# Patient Record
Sex: FEMALE | Race: WHITE | NOT HISPANIC OR LATINO | Employment: OTHER | URBAN - METROPOLITAN AREA
[De-identification: names, ages, dates, MRNs, and addresses within clinical notes are randomized per-mention and may not be internally consistent; named-entity substitution may affect disease eponyms.]

---

## 2020-07-11 ENCOUNTER — APPOINTMENT (OUTPATIENT)
Dept: CT IMAGING | Facility: CLINIC | Age: 45
End: 2020-07-11
Attending: EMERGENCY MEDICINE
Payer: MEDICARE

## 2020-07-11 ENCOUNTER — HOSPITAL ENCOUNTER (EMERGENCY)
Facility: CLINIC | Age: 45
Discharge: HOME | End: 2020-07-11
Attending: EMERGENCY MEDICINE | Admitting: EMERGENCY MEDICINE
Payer: MEDICARE

## 2020-07-11 ENCOUNTER — APPOINTMENT (OUTPATIENT)
Dept: GENERAL RADIOLOGY | Facility: CLINIC | Age: 45
End: 2020-07-11
Attending: EMERGENCY MEDICINE
Payer: MEDICARE

## 2020-07-11 VITALS
OXYGEN SATURATION: 99 % | WEIGHT: 200 LBS | RESPIRATION RATE: 16 BRPM | DIASTOLIC BLOOD PRESSURE: 100 MMHG | SYSTOLIC BLOOD PRESSURE: 179 MMHG | BODY MASS INDEX: 35.43 KG/M2 | TEMPERATURE: 98.6 F | HEART RATE: 71 BPM

## 2020-07-11 DIAGNOSIS — I16.0 HYPERTENSIVE URGENCY: ICD-10-CM

## 2020-07-11 DIAGNOSIS — N28.9 RENAL INSUFFICIENCY: ICD-10-CM

## 2020-07-11 DIAGNOSIS — M54.50 ACUTE BILATERAL LOW BACK PAIN WITHOUT SCIATICA: ICD-10-CM

## 2020-07-11 LAB
ALBUMIN SERPL-MCNC: 3.8 G/DL (ref 3.4–5)
ALBUMIN UR-MCNC: 10 MG/DL
ALP SERPL-CCNC: 81 U/L (ref 40–150)
ALT SERPL W P-5'-P-CCNC: 31 U/L (ref 0–50)
ANION GAP SERPL CALCULATED.3IONS-SCNC: 5 MMOL/L (ref 3–14)
APPEARANCE UR: ABNORMAL
AST SERPL W P-5'-P-CCNC: 22 U/L (ref 0–45)
BASOPHILS # BLD AUTO: 0 10E9/L (ref 0–0.2)
BASOPHILS NFR BLD AUTO: 0.4 %
BILIRUB SERPL-MCNC: 0.3 MG/DL (ref 0.2–1.3)
BILIRUB UR QL STRIP: NEGATIVE
BUN SERPL-MCNC: 13 MG/DL (ref 7–30)
CALCIUM SERPL-MCNC: 9.2 MG/DL (ref 8.5–10.1)
CHLORIDE SERPL-SCNC: 108 MMOL/L (ref 94–109)
CO2 SERPL-SCNC: 29 MMOL/L (ref 20–32)
COLOR UR AUTO: YELLOW
CREAT SERPL-MCNC: 1.18 MG/DL (ref 0.52–1.04)
DIFFERENTIAL METHOD BLD: NORMAL
EOSINOPHIL # BLD AUTO: 0.2 10E9/L (ref 0–0.7)
EOSINOPHIL NFR BLD AUTO: 1.9 %
ERYTHROCYTE [DISTWIDTH] IN BLOOD BY AUTOMATED COUNT: 13.2 % (ref 10–15)
GFR SERPL CREATININE-BSD FRML MDRD: 55 ML/MIN/{1.73_M2}
GLUCOSE SERPL-MCNC: 67 MG/DL (ref 70–99)
GLUCOSE UR STRIP-MCNC: NEGATIVE MG/DL
HCT VFR BLD AUTO: 41.2 % (ref 35–47)
HGB BLD-MCNC: 13.4 G/DL (ref 11.7–15.7)
HGB UR QL STRIP: NEGATIVE
IMM GRANULOCYTES # BLD: 0 10E9/L (ref 0–0.4)
IMM GRANULOCYTES NFR BLD: 0.2 %
KETONES UR STRIP-MCNC: NEGATIVE MG/DL
LACTATE BLD-SCNC: 1 MMOL/L (ref 0.7–2)
LEUKOCYTE ESTERASE UR QL STRIP: ABNORMAL
LIPASE SERPL-CCNC: 67 U/L (ref 73–393)
LYMPHOCYTES # BLD AUTO: 3 10E9/L (ref 0.8–5.3)
LYMPHOCYTES NFR BLD AUTO: 32.5 %
MCH RBC QN AUTO: 29.6 PG (ref 26.5–33)
MCHC RBC AUTO-ENTMCNC: 32.5 G/DL (ref 31.5–36.5)
MCV RBC AUTO: 91 FL (ref 78–100)
MONOCYTES # BLD AUTO: 0.8 10E9/L (ref 0–1.3)
MONOCYTES NFR BLD AUTO: 8.2 %
NEUTROPHILS # BLD AUTO: 5.2 10E9/L (ref 1.6–8.3)
NEUTROPHILS NFR BLD AUTO: 56.8 %
NITRATE UR QL: NEGATIVE
NRBC # BLD AUTO: 0 10*3/UL
NRBC BLD AUTO-RTO: 0 /100
PH UR STRIP: 5 PH (ref 5–7)
PLATELET # BLD AUTO: 250 10E9/L (ref 150–450)
POTASSIUM SERPL-SCNC: 4.1 MMOL/L (ref 3.4–5.3)
PROT SERPL-MCNC: 7.5 G/DL (ref 6.8–8.8)
RBC # BLD AUTO: 4.53 10E12/L (ref 3.8–5.2)
RBC #/AREA URNS AUTO: 0 /HPF (ref 0–2)
SODIUM SERPL-SCNC: 142 MMOL/L (ref 133–144)
SOURCE: ABNORMAL
SP GR UR STRIP: 1.02 (ref 1–1.03)
SQUAMOUS #/AREA URNS AUTO: 7 /HPF (ref 0–1)
UROBILINOGEN UR STRIP-MCNC: NORMAL MG/DL (ref 0–2)
WBC # BLD AUTO: 9.1 10E9/L (ref 4–11)
WBC #/AREA URNS AUTO: 4 /HPF (ref 0–5)

## 2020-07-11 PROCEDURE — 80053 COMPREHEN METABOLIC PANEL: CPT | Performed by: EMERGENCY MEDICINE

## 2020-07-11 PROCEDURE — 25000128 H RX IP 250 OP 636: Performed by: EMERGENCY MEDICINE

## 2020-07-11 PROCEDURE — 83605 ASSAY OF LACTIC ACID: CPT | Performed by: EMERGENCY MEDICINE

## 2020-07-11 PROCEDURE — 83690 ASSAY OF LIPASE: CPT | Performed by: EMERGENCY MEDICINE

## 2020-07-11 PROCEDURE — 96374 THER/PROPH/DIAG INJ IV PUSH: CPT | Mod: 59

## 2020-07-11 PROCEDURE — 85025 COMPLETE CBC W/AUTO DIFF WBC: CPT | Performed by: EMERGENCY MEDICINE

## 2020-07-11 PROCEDURE — 81001 URINALYSIS AUTO W/SCOPE: CPT | Performed by: EMERGENCY MEDICINE

## 2020-07-11 PROCEDURE — 99285 EMERGENCY DEPT VISIT HI MDM: CPT | Mod: 25

## 2020-07-11 PROCEDURE — 74177 CT ABD & PELVIS W/CONTRAST: CPT

## 2020-07-11 PROCEDURE — 25800030 ZZH RX IP 258 OP 636: Performed by: EMERGENCY MEDICINE

## 2020-07-11 PROCEDURE — 93005 ELECTROCARDIOGRAM TRACING: CPT

## 2020-07-11 PROCEDURE — 96361 HYDRATE IV INFUSION ADD-ON: CPT

## 2020-07-11 PROCEDURE — 96375 TX/PRO/DX INJ NEW DRUG ADDON: CPT | Mod: 59

## 2020-07-11 PROCEDURE — 25000132 ZZH RX MED GY IP 250 OP 250 PS 637: Mod: GY | Performed by: EMERGENCY MEDICINE

## 2020-07-11 PROCEDURE — 25000125 ZZHC RX 250: Performed by: EMERGENCY MEDICINE

## 2020-07-11 PROCEDURE — 71045 X-RAY EXAM CHEST 1 VIEW: CPT

## 2020-07-11 RX ORDER — OLANZAPINE 10 MG/1
10 TABLET ORAL ONCE
Status: COMPLETED | OUTPATIENT
Start: 2020-07-11 | End: 2020-07-11

## 2020-07-11 RX ORDER — HYDRALAZINE HYDROCHLORIDE 20 MG/ML
5 INJECTION INTRAMUSCULAR; INTRAVENOUS ONCE
Status: COMPLETED | OUTPATIENT
Start: 2020-07-11 | End: 2020-07-11

## 2020-07-11 RX ORDER — IOPAMIDOL 755 MG/ML
500 INJECTION, SOLUTION INTRAVASCULAR ONCE
Status: COMPLETED | OUTPATIENT
Start: 2020-07-11 | End: 2020-07-11

## 2020-07-11 RX ORDER — HYDROMORPHONE HYDROCHLORIDE 1 MG/ML
0.5 INJECTION, SOLUTION INTRAMUSCULAR; INTRAVENOUS; SUBCUTANEOUS ONCE
Status: COMPLETED | OUTPATIENT
Start: 2020-07-11 | End: 2020-07-11

## 2020-07-11 RX ORDER — SODIUM CHLORIDE 9 MG/ML
INJECTION, SOLUTION INTRAVENOUS CONTINUOUS
Status: DISCONTINUED | OUTPATIENT
Start: 2020-07-11 | End: 2020-07-12 | Stop reason: HOSPADM

## 2020-07-11 RX ORDER — CYCLOBENZAPRINE HCL 10 MG
10 TABLET ORAL 3 TIMES DAILY PRN
Qty: 20 TABLET | Refills: 0 | Status: SHIPPED | OUTPATIENT
Start: 2020-07-11 | End: 2020-07-17

## 2020-07-11 RX ORDER — HYDRALAZINE HYDROCHLORIDE 20 MG/ML
5 INJECTION INTRAMUSCULAR; INTRAVENOUS ONCE
Status: DISCONTINUED | OUTPATIENT
Start: 2020-07-11 | End: 2020-07-12 | Stop reason: HOSPADM

## 2020-07-11 RX ORDER — ATENOLOL 50 MG/1
100 TABLET ORAL DAILY
Qty: 15 TABLET | Refills: 0 | Status: SHIPPED | OUTPATIENT
Start: 2020-07-11 | End: 2020-07-26

## 2020-07-11 RX ORDER — PREDNISONE 20 MG/1
50 TABLET ORAL DAILY
Qty: 13 TABLET | Refills: 0 | Status: SHIPPED | OUTPATIENT
Start: 2020-07-11 | End: 2020-07-16

## 2020-07-11 RX ADMIN — HYDRALAZINE HYDROCHLORIDE 5 MG: 20 INJECTION INTRAMUSCULAR; INTRAVENOUS at 19:10

## 2020-07-11 RX ADMIN — SODIUM CHLORIDE 60 ML: 9 INJECTION, SOLUTION INTRAVENOUS at 19:56

## 2020-07-11 RX ADMIN — OLANZAPINE 10 MG: 10 TABLET, FILM COATED ORAL at 19:33

## 2020-07-11 RX ADMIN — SODIUM CHLORIDE 1000 ML: 9 INJECTION, SOLUTION INTRAVENOUS at 19:25

## 2020-07-11 RX ADMIN — HYDROMORPHONE HYDROCHLORIDE 0.5 MG: 1 INJECTION, SOLUTION INTRAMUSCULAR; INTRAVENOUS; SUBCUTANEOUS at 19:40

## 2020-07-11 RX ADMIN — IOPAMIDOL 80 ML: 755 INJECTION, SOLUTION INTRAVENOUS at 19:55

## 2020-07-11 ASSESSMENT — ENCOUNTER SYMPTOMS
DIFFICULTY URINATING: 0
FEVER: 0
FREQUENCY: 1
COUGH: 0
DIARRHEA: 0
HEADACHES: 1
VOMITING: 0
SHORTNESS OF BREATH: 0
FLANK PAIN: 1
NAUSEA: 0
CHILLS: 0
SORE THROAT: 0

## 2020-07-11 NOTE — ED TRIAGE NOTES
Pt complaints of bilateral flank pain left worse than right for x4 days. States she is also having urinary frequency. ABC intact. A&Ox4.

## 2020-07-11 NOTE — ED PROVIDER NOTES
"  History     Chief Complaint:  ***  No chief complaint on file.      HPI   Karen Chance is a 45 year old female who presents with ***    Allergies:  Cefzil  Celebrex  Codeine  Morphine  Naproxen  Sulfa drugs  Toradol  Tramadol    Medications:    Klonopin  Atenolol  Prozac    Past Medical History:    Anxiety  Depression  Hypertension    Past Surgical History:    Abdomen surgery  Gyn surgery  Hernia repair  Orthopedic surgery    Family History:    History reviewed. No pertinent family history.     Social History:  Smoking status: Former smoker  Alcohol use: No  Drug use: No  PCP: Rita Gayle MD  Presents to the ED with ***  Marital Status:  Single [1]    Review of Systems  ***    Physical Exam   No data found.    Physical Exam  ***    Emergency Department Course   ECG:  ***    Imaging:  {Radiographic findings?:728496540::\" \"}  ***    Laboratory:  ***    Procedures:  ***        Interventions:  ***  {Response to meds:890024::\" \"}    Emergency Department Course:  ***       Impression & Plan      {[facility]:121387}    {trauma activation?:374400::\" \"}  CMS Diagnoses: {Sepsis/Septic Shock/Stemi/Stroke:054302::\" \"}       Medical Decision Making:  ***  Critical Care time:  {none or minutes:710759::\"none\"}    Diagnosis:  No diagnosis found.    Disposition:  {discharged to home/discharged to home with.../Admitted to...:403654}    Discharge Medications:  New Prescriptions    No medications on file     I, Stephen Lott, am serving as a scribe at 5:49 PM on 7/11/2020 to document services personally performed by *** based on my observations and the provider's statements to me.     Stephen Lott  7/11/2020   [facility] EMERGENCY DEPARTMENT    "

## 2020-07-11 NOTE — ED PROVIDER NOTES
"  History     Chief Complaint:  Flank Pain    HPI   Karen Chance is a 45 year old female with a history of hypertension who presents with flank pain. The patient reports 4 days ago she developed bilateral flank pain that wraps around to her lower abdomen. At the same time, she developed urgency but denies burning with urination. She describes her pain as \"having a baby out her backside\" and notes the pain increases with breathing. She notes her current symptoms are distinct from past UTIs and chronic back pain. She has taken ibuprofen and Tylenol with no alleviation. She endorses headache. Of note, she is not taking her Atenolol and reports she has not treated her hypertension in a long time.     Allergies:  Cefzil  Celebrex  Codeine sulfate  Morphine HCl  Naproxen  Oxycodone  Sulfa drugs  Toradol  Tramadol     Medications:    Atenolol  Clonazepam  Fluoxetine HCl  ondansetron    Past Medical History:    Anxiety  Depressive disorder  Hypertension    Past Surgical History:    Abdomen surgery  GYN surgery  Hernia repair  Orthopedic surgery    Family History:    History reviewed. No pertinent family history.     Social History:  Smoking status- former smoker  Alcohol use- no  Drug use- no  Marital Status:  Single [1]       Review of Systems   Constitutional: Negative for chills and fever.   HENT: Negative for sore throat.    Respiratory: Negative for cough and shortness of breath.    Cardiovascular: Negative for chest pain.   Gastrointestinal: Negative for diarrhea, nausea and vomiting.   Genitourinary: Positive for flank pain, frequency and urgency. Negative for difficulty urinating.   Neurological: Positive for headaches.   All other systems reviewed and are negative.      Physical Exam     Patient Vitals for the past 24 hrs:   BP Temp Heart Rate Resp SpO2 Weight   07/11/20 1749 (!) 192/134 98.6  F (37  C) 107 18 100 % 90.7 kg (200 lb)       Physical Exam    GEN: alert    HEAD: atraumatic    EYES: pupils " reactive, extraocular muscles intact, conjunctivae normal    ENT: TMs normal as are EACs; nares patent; normal posterior pharynx and oral mucosa    NECK: no posterior midline tenderness, no meningeal signs, trachea midline     RESPIRATORY: no tachypnea, breath sounds clear to auscultation    CVS: normal S1/S2, no murmurs/rubs/gallops    ABDOMEN: soft, tender in paralumbar region bilaterally, uncomfortable with transfer, no localized abdominal tenderness, no masses or organomegaly, no rebound, positive bowel sounds    MUSCULOSKELETAL: no deformities    SKIN: warm and dry, no acute rashes or ulceration, no erythema     NEURO: GCS 15, cranial nerves intact.  Motor and sensory- good tone; normal gait and coordination    LYMPH: no lymphadenopathy    PSYCHE:  Talkative.  No SI/HI, no hallucinations    Emergency Department Course     ECG (18:55:48):  Rate 80 bpm. VA interval 128. QRS duration 80. QT/QTc 402/463. P-R-T axes 74 68 38. Normal sinus rhythm. Normal ECG. Interpreted at 1900 by Jose Alberto Aguero MD.    Imaging:  Radiology findings were communicated with the patient who voiced understanding of the findings.    XR Chest Port 1 View:  Negative chest.    As read by Radiology.    CT Aortic Survey w contrast   1.  No thoracic or abdominal aortic dissection or other acute abnormality.     Reading per radiology    Laboratory:  Laboratory findings were communicated with the patient who voiced understanding of the findings.    CBC: WNL (WBC 9.1, HGB 13.4, )  CMP: Glucose 67 (L), GFR estimate 55 (L) o/w WNL (Creatinine 1.18 (H))    Lipase: 67  Lactic Acid: 1.0    UA with Microscopic: Protein Albumin 10, leukocyte esterase - small, squamous epithelial 7 (H) o/w negative.     Interventions:  1910 Apresoline    1925 NS 1L IV Bolus    1933 Zyprexa 10 mg IV    1940 Dilaudid 0.5 mg IV    2124 Apresoline 5 mg IV    Emergency Department Course:  Past medical records, nursing notes, and vitals reviewed.    1806 I performed an  exam of the patient as documented above.      IV was inserted and blood was drawn for laboratory testing, results above.     The patient provided a urine sample here in the emergency department. This was sent for laboratory testing, findings above.     The patient was sent for a XR chest and CT aortic while in the emergency department, results above.     2150 I rechecked the patient and discussed the results of her workup thus far.     Findings and plan explained to the Patient. Patient discharged home with instructions regarding supportive care, medications, and reasons to return. The importance of close follow-up was reviewed.     Impression & Plan     Medical Decision Making:  Karen Chance is a 45 year old female who presents with bilateral flank pain. She is noted to be very hypertensive here. She was given pain medicine and remained hypertensive, and given hydralazine. She expressed to the nurse that she'd also been having chest pain. In light of her back pain with chest pain and abdominal pain I was concerned about a possible aortic dissection. I did a CT aortic survery which was fortunately negative.     She did improve with treatment here which consisted of dilaudid and olanzapine. She had some mildly pressured speech when she came in and was somewhat hyperkinetic in terms of speech. She is continuously talking about various things mainly about her medical complaints. EKG was done when she complained of chest pain and it showed no ischemic changes. I did not order troponin as the EKG was normal and she is not diabetic so I do not think she is having silent ischemia. The chest pain seems to be radiating from her flank as it wraps around her stomach also causing pain spreads up into the lower chest region at times. This is intermittent in nature this seems to be possibly worse with movement initially. This actually resolved with treatment of olanzapine and dilaudid. Olanzapine was given because she  was quite anxious and a little agitated about her condition. This seemed to help quite considerably.     Labs show clear urine, lipase is normal, CBC with differential entirely normal, and comprehensive metabolic panrl shows glucose actually normal but slightly low at 67, creatinine elevated at 1.18, and GFR estimate of 55. This is significant change from 2013 when I can see her last creatinine GFR. They were completely normal then. She has not been taking her atenolol in a year. I discussed with her if she continues on with this course and she doesn't take her antihypertensives she will lose her kidneys and wind up on dialysis. I tried to describe that for her. She stated she would start her atenolol. She was on 100 mg daily, I have recommended she start first with 15 mg once a day and then talk with her doctor over the course of a week and have her blood pressure rechecked and reevaluated of her symptoms at that time. I asked her if she would like me to write a new prescription for her so she doesn't forget and take 100 to start she stated she would remember what to do she prefers me to read the script as written but she will start with half that dose as dicussed. Earlier patient is ambulatory her pain has improved and she is being discharged home with my instructions and medications and follow up.     Diagnosis:    ICD-10-CM   1. Acute bilateral low back pain without sciatica  M54.5   2. Hypertensive urgency  I16.0   3. Renal insufficiency  N28.9       Disposition:  Discharged to home.    Discharge Medications:  New Prescriptions    ATENOLOL (TENORMIN) 50 MG TABLET    Take 2 tablets (100 mg) by mouth daily for 15 days    CYCLOBENZAPRINE (FLEXERIL) 10 MG TABLET    Take 1 tablet (10 mg) by mouth 3 times daily as needed for muscle spasms    PREDNISONE (DELTASONE) 20 MG TABLET    Take 2.5 tablets (50 mg) by mouth daily for 5 days       Scribe Disclosure:  I, Jane Ennis, am serving as a scribe at 6:04 PM on 7/11/2020  to document services personally performed by Jose Alberto Aguero MD based on my observations and the provider's statements to me.        Jose Alberto Aguero MD  07/12/20 1916

## 2020-07-11 NOTE — ED AVS SNAPSHOT
[facility] Emergency Department  [address]  [facility] [address]  Phone:  [phone]  Fax:  [phone]                                    Karen Chance   MRN: [id number]    Department:  [facility] Emergency Department   Date of Visit:  7/11/2020           After Visit Summary Signature Page    I have received my discharge instructions, and my questions have been answered. I have discussed any challenges I see with this plan with the nurse or doctor.    ..........................................................................................................................................  Patient/Patient Representative Signature      ..........................................................................................................................................  Patient Representative Print Name and Relationship to Patient    ..................................................               ................................................  Date                                   Time    ..........................................................................................................................................  Reviewed by Signature/Title    ...................................................              ..............................................  Date                                               Time          22EPIC Rev 08/18

## 2020-07-12 NOTE — ED NOTES
Patient feels much better. She is in no pain now when she is lying still. She has mild pain when up and walking, which she rates a 3/10. This pain is only located in her left flank, and all other pain has resolved. Discharge instructions reviewed with patient. She is in stable condition and understands the discharge instructions. She is eating skittles in the room and has been able to void and ambulate without difficulty. She understands that she is not able to drive herself home, as she received an opioid while in the ED. She will be calling a family member for a ride home. I offered to help her get her prescriptions filled with our pharmacy in the hospital, however she declined.

## 2020-07-12 NOTE — ED NOTES
Patient complaining of severe pain in her head, chest, abdomen, and back. MD notified. CT scan will be ordered. MD aware that there was minimal response to hydralazine. Patient is stable at this time.

## 2020-07-12 NOTE — ED NOTES
Per MD, patient instructed to start with only one tab of atenolol (50mg) daily. She should follow up with primary care before increasing the dose

## 2020-07-12 NOTE — DISCHARGE INSTRUCTIONS
YOUR KIDNEY FUNCTION IS BEING AFFECTED, PERMANENTLY BY UNCONTROLLED HIGH BLOOD PRESSURE.  IT IS VERY IMPORTANT FOR YOU TO TAKE YOUR BLOOD PRESSURE MEDICINE OR YOU WILL EVENTUALLY LOSE YOUR KIDNEYS AND HAVE TO GO ON DIALYSIS.  RESTART YOUR ATENOLOL DAILY, EVERYDAY, AND FOLLOW UP WITH YOUR DOCTOR REGULARLY TO CHECK YOUR BLOOD PRESSURE.  Discharge Instructions  Back Pain  You were seen today for back pain. Back pain can have many causes, but most will get better without surgery or other specific treatment. Sometimes there is a herniated ( slipped ) disc. We don t usually do MRI scans to look for these right away, since most herniated discs will get better on their own with time.  Today, we did not find any evidence that your back pain was caused by a serious condition, such as an infection, fracture, or tumor. However, sometimes symptoms develop over time and cannot be found during an emergency visit, so it is very important that you follow up with your primary doctor.  Return to the Emergency Department if:  You develop a fever with your back pain.   You have weakness or change in sensation in one or both legs.  You lose control of your bowels or bladder, or can t empty your bladder.  Your pain gets much worse.     Follow-up with your doctor:  Unless your pain has completely gone away, please make an appointment with your doctor within one week.  You may need further management of your back pain, such as more pain medication, imaging such as an X-ray or MRI, or physical therapy.    What can I do to help myself?  Remain Active -- People are often afraid that they will hurt their back further or delay recovery by remaining active, but this is one of the best things you can do for your back. In fact, prolonged bed rest is not recommended. Studies have shown that people with low back pain recover faster when they remain active. Movement helps to bring blood flow to the muscles and relieve muscle spasms as well as  preventing loss of muscle strength.  Heat -- Using a heating pad can help with low back pain during the first few weeks. Do not sleep with a heating pad, as you can be burned.   Pain medications - You may take a pain medication such as Tylenol  (acetaminophen), Advil , Nuprin  (ibuprofen) or Aleve  (naproxen).  If you have been given a narcotic such as Vicodin  (hydrocodone with acetaminophen), Percocet  (oxycodone with acetaminophen), codeine, or a muscle relaxant such as Flexeril  (cyclobenzaprine) or Soma  (carisoprodol), do not drive for four hours after you have taken it. If the narcotic contains Tylenol  (acetaminophen), do not take Tylenol  with it. All narcotics will cause constipation, so eat a high fiber diet.   If you were given a prescription for medicine here today, be sure to read all of the information (including the package insert) that comes with your prescription.  This will include important information about the medicine, its side effects, and any warnings that you need to know about.  The pharmacist who fills the prescription can provide more information and answer questions you may have about the medicine.  If you have questions or concerns that the pharmacist cannot address, please call or return to the Emergency Department.   Opioid Medication Information    Pain medications are among the most commonly prescribed medicines, so we are including this information for all our patients. If you did not receive pain medication or get a prescription for pain medicine, you can ignore it.     You may have been given a prescription for an opioid (narcotic) pain medicine and/or have received a pain medicine while here in the Emergency Department. These medicines can make you drowsy or impaired. You must not drive, operate dangerous equipment, or engage in any other dangerous activities while taking these medications. If you drive while taking these medications, you could be arrested for DUI, or driving  under the influence. Do not drink any alcohol while you are taking these medications.     Opioid pain medications can cause addiction. If you have a history of chemical dependency of any type, you are at a higher risk of becoming addicted to pain medications.  Only take these prescribed medications to treat your pain when all other options have been tried. Take it for as short a time and as few doses as possible. Store your pain pills in a secure place, as they are frequently stolen and provide a dangerous opportunity for children or visitors in your house to start abusing these powerful medications. We will not replace any lost or stolen medicine.  As soon as your pain is better, you should flush all your remaining medication.     Many prescription pain medications contain Tylenol  (acetaminophen), including Vicodin , Tylenol #3 , Norco , Lortab , and Percocet .  You should not take any extra pills of Tylenol  if you are using these prescription medications or you can get very sick.  Do not ever take more than 3000 mg of acetaminophen in any 24 hour period.    All opioids tend to cause constipation. Drink plenty of water and eat foods that have a lot of fiber, such as fruits, vegetables, prune juice, apple juice and high fiber cereal.  Take a laxative if you don t move your bowels at least every other day. Miralax , Milk of Magnesia, Colace , or Senna  can be used to keep you regular.      Remember that you can always come back to the Emergency Department if you are not able to see your regular doctor in the amount of time listed above, if you get any new symptoms, or if there is anything that worries you.

## 2020-07-12 NOTE — PROGRESS NOTES
07/11/20 2201   Child Life   Location ED   Intervention Developmental Play;Family Support   Family Support Comment provided activities for patient's child during er visit   Outcomes/Follow Up Provided Materials

## 2020-07-13 LAB — INTERPRETATION ECG - MUSE: NORMAL

## 2020-07-29 ENCOUNTER — APPOINTMENT (OUTPATIENT)
Dept: CT IMAGING | Facility: CLINIC | Age: 45
End: 2020-07-29
Attending: PHYSICIAN ASSISTANT
Payer: MEDICARE

## 2020-07-29 ENCOUNTER — HOSPITAL ENCOUNTER (EMERGENCY)
Facility: CLINIC | Age: 45
Discharge: HOME | End: 2020-07-29
Attending: PHYSICIAN ASSISTANT | Admitting: PHYSICIAN ASSISTANT
Payer: MEDICARE

## 2020-07-29 VITALS
TEMPERATURE: 98.8 F | HEART RATE: 69 BPM | SYSTOLIC BLOOD PRESSURE: 182 MMHG | OXYGEN SATURATION: 99 % | RESPIRATION RATE: 20 BRPM | DIASTOLIC BLOOD PRESSURE: 104 MMHG

## 2020-07-29 DIAGNOSIS — K12.2 ABSCESS OF BUCCAL SPACE OF MOUTH: ICD-10-CM

## 2020-07-29 DIAGNOSIS — R03.0 ELEVATED BLOOD PRESSURE, SITUATIONAL: ICD-10-CM

## 2020-07-29 DIAGNOSIS — R68.84 JAW PAIN: ICD-10-CM

## 2020-07-29 LAB
ANION GAP SERPL CALCULATED.3IONS-SCNC: 1 MMOL/L (ref 3–14)
BASOPHILS # BLD AUTO: 0.1 10E9/L (ref 0–0.2)
BASOPHILS NFR BLD AUTO: 0.7 %
BUN SERPL-MCNC: 12 MG/DL (ref 7–30)
CALCIUM SERPL-MCNC: 8.5 MG/DL (ref 8.5–10.1)
CHLORIDE SERPL-SCNC: 105 MMOL/L (ref 94–109)
CO2 SERPL-SCNC: 30 MMOL/L (ref 20–32)
CREAT SERPL-MCNC: 0.96 MG/DL (ref 0.52–1.04)
DIFFERENTIAL METHOD BLD: ABNORMAL
EOSINOPHIL # BLD AUTO: 0.1 10E9/L (ref 0–0.7)
EOSINOPHIL NFR BLD AUTO: 1.1 %
ERYTHROCYTE [DISTWIDTH] IN BLOOD BY AUTOMATED COUNT: 13 % (ref 10–15)
GFR SERPL CREATININE-BSD FRML MDRD: 71 ML/MIN/{1.73_M2}
GLUCOSE SERPL-MCNC: 101 MG/DL (ref 70–99)
HCT VFR BLD AUTO: 43.3 % (ref 35–47)
HGB BLD-MCNC: 13.9 G/DL (ref 11.7–15.7)
IMM GRANULOCYTES # BLD: 0 10E9/L (ref 0–0.4)
IMM GRANULOCYTES NFR BLD: 0.3 %
LYMPHOCYTES # BLD AUTO: 2.9 10E9/L (ref 0.8–5.3)
LYMPHOCYTES NFR BLD AUTO: 24.3 %
MCH RBC QN AUTO: 29.4 PG (ref 26.5–33)
MCHC RBC AUTO-ENTMCNC: 32.1 G/DL (ref 31.5–36.5)
MCV RBC AUTO: 92 FL (ref 78–100)
MONOCYTES # BLD AUTO: 0.9 10E9/L (ref 0–1.3)
MONOCYTES NFR BLD AUTO: 7.3 %
NEUTROPHILS # BLD AUTO: 7.8 10E9/L (ref 1.6–8.3)
NEUTROPHILS NFR BLD AUTO: 66.3 %
NRBC # BLD AUTO: 0 10*3/UL
NRBC BLD AUTO-RTO: 0 /100
PLATELET # BLD AUTO: 251 10E9/L (ref 150–450)
POTASSIUM SERPL-SCNC: 4.3 MMOL/L (ref 3.4–5.3)
RBC # BLD AUTO: 4.72 10E12/L (ref 3.8–5.2)
SODIUM SERPL-SCNC: 136 MMOL/L (ref 133–144)
WBC # BLD AUTO: 11.7 10E9/L (ref 4–11)

## 2020-07-29 PROCEDURE — 25000128 H RX IP 250 OP 636: Performed by: PHYSICIAN ASSISTANT

## 2020-07-29 PROCEDURE — 80048 BASIC METABOLIC PNL TOTAL CA: CPT | Performed by: PHYSICIAN ASSISTANT

## 2020-07-29 PROCEDURE — 85025 COMPLETE CBC W/AUTO DIFF WBC: CPT | Performed by: PHYSICIAN ASSISTANT

## 2020-07-29 PROCEDURE — 25000125 ZZHC RX 250: Performed by: PHYSICIAN ASSISTANT

## 2020-07-29 PROCEDURE — 99285 EMERGENCY DEPT VISIT HI MDM: CPT | Mod: 25

## 2020-07-29 PROCEDURE — 70487 CT MAXILLOFACIAL W/DYE: CPT | Mod: 59

## 2020-07-29 PROCEDURE — 25000132 ZZH RX MED GY IP 250 OP 250 PS 637: Mod: GY | Performed by: PHYSICIAN ASSISTANT

## 2020-07-29 PROCEDURE — 96374 THER/PROPH/DIAG INJ IV PUSH: CPT

## 2020-07-29 RX ORDER — CLINDAMYCIN HCL 150 MG
450 CAPSULE ORAL 3 TIMES DAILY
Qty: 72 CAPSULE | Refills: 0 | Status: SHIPPED | OUTPATIENT
Start: 2020-07-29 | End: 2020-08-06

## 2020-07-29 RX ORDER — OXYCODONE AND ACETAMINOPHEN 5; 325 MG/1; MG/1
1 TABLET ORAL EVERY 4 HOURS PRN
Qty: 10 TABLET | Refills: 0 | Status: SHIPPED | OUTPATIENT
Start: 2020-07-29 | End: 2022-09-11

## 2020-07-29 RX ORDER — CLINDAMYCIN HCL 150 MG
450 CAPSULE ORAL ONCE
Status: COMPLETED | OUTPATIENT
Start: 2020-07-29 | End: 2020-07-29

## 2020-07-29 RX ORDER — HYDROMORPHONE HYDROCHLORIDE 1 MG/ML
0.5 INJECTION, SOLUTION INTRAMUSCULAR; INTRAVENOUS; SUBCUTANEOUS ONCE
Status: COMPLETED | OUTPATIENT
Start: 2020-07-29 | End: 2020-07-29

## 2020-07-29 RX ORDER — IBUPROFEN 600 MG/1
600 TABLET, FILM COATED ORAL EVERY 8 HOURS PRN
Qty: 30 TABLET | Refills: 0 | Status: SHIPPED | OUTPATIENT
Start: 2020-07-29

## 2020-07-29 RX ORDER — OXYCODONE HYDROCHLORIDE 5 MG/1
10 TABLET ORAL ONCE
Status: COMPLETED | OUTPATIENT
Start: 2020-07-29 | End: 2020-07-29

## 2020-07-29 RX ORDER — IOPAMIDOL 755 MG/ML
500 INJECTION, SOLUTION INTRAVASCULAR ONCE
Status: COMPLETED | OUTPATIENT
Start: 2020-07-29 | End: 2020-07-29

## 2020-07-29 RX ADMIN — SODIUM CHLORIDE 65 ML: 9 INJECTION, SOLUTION INTRAVENOUS at 22:45

## 2020-07-29 RX ADMIN — IOPAMIDOL 80 ML: 755 INJECTION, SOLUTION INTRAVENOUS at 22:45

## 2020-07-29 RX ADMIN — HYDROMORPHONE HYDROCHLORIDE 0.5 MG: 1 INJECTION, SOLUTION INTRAMUSCULAR; INTRAVENOUS; SUBCUTANEOUS at 23:31

## 2020-07-29 RX ADMIN — CLINDAMYCIN HYDROCHLORIDE 450 MG: 150 CAPSULE ORAL at 23:45

## 2020-07-29 RX ADMIN — OXYCODONE HYDROCHLORIDE 10 MG: 5 TABLET ORAL at 21:19

## 2020-07-29 ASSESSMENT — ENCOUNTER SYMPTOMS
HEADACHES: 1
NECK PAIN: 1
FACIAL SWELLING: 1

## 2020-07-29 NOTE — ED AVS SNAPSHOT
[facility] Emergency Department  [address]  [facility] [address]  Phone:  [phone]  Fax:  [phone]                                    Karen Chance   MRN: [id number]    Department:  [facility] Emergency Department   Date of Visit:  7/29/2020           After Visit Summary Signature Page    I have received my discharge instructions, and my questions have been answered. I have discussed any challenges I see with this plan with the nurse or doctor.    ..........................................................................................................................................  Patient/Patient Representative Signature      ..........................................................................................................................................  Patient Representative Print Name and Relationship to Patient    ..................................................               ................................................  Date                                   Time    ..........................................................................................................................................  Reviewed by Signature/Title    ...................................................              ..............................................  Date                                               Time          22EPIC Rev 08/18

## 2020-07-30 NOTE — ED TRIAGE NOTES
Patient states she has had severe left upper gum swelling for four days.      States she has terrible teeth as she is very afraid of the dentist.  States she needs complete sedation before anything can be done.      ABCs intact.  Alert and oriented x 3.

## 2020-07-30 NOTE — ED PROVIDER NOTES
History     Chief Complaint:  Dental Pain  Dental Pain       HPI  Karen Chance is a 45 year old year old female with a history of hypertension who presents for evaluation of dental pain. The patient states that about four days ago she began experiencing left upper side dental pain. She has not taken her temperature to check for fever. She has been taking many tylenol and ibuprofen to try to numb the pain, but she says she has mostly just been laying in bed because she is in so much pain. She also notes swelling to her face as well as neck pain and headache. Her last dose of aspirin was about an hour ago. She has no other medical problems besides hypertension.  No chest pain, back pain, shortness of breath or vomiting.  No vision changes.      Allergies:  Cefzil  Celebrex  Codeine sulfate  Morphine HCl  Naproxen  Sulfa drugs  Toradol  Tramadol       Medications:   Medications reviewed. No pertinent medications.      Medical History:   Anxiety  Depressive disorder  Hypertension  Opiate addiction  Abdominal wall cellulitis  Nicotine addiction  Alcohol dependence  Degenerative joint disease  Lumbar disc herniation  Anterolisthesis  Mass of larynx      Surgical History:  Gyn surgery  Hernia repair  Knee arthroscopy  Bunions  Laparoscopy abdomen diagnostic  Direct laryngoscopy and excision of vocal cord lesion    Family History:   Hepatitis C  Lupus      Social History:  Smoking Status: Former Smoker    Type: Cigarettes   Smokeless Tobacco: Never Used  Alcohol Use: Negative  Drug Use: Negative  Primary Care: No Ref-Primary, Physician       Review of Systems   HENT: Positive for dental problem and facial swelling.    Musculoskeletal: Positive for neck pain.   Neurological: Positive for headaches.   All other systems reviewed and are negative.    Physical Exam     Patient Vitals for the past 24 hrs:   BP Temp Temp src Pulse Heart Rate Resp SpO2   07/29/20 2323 (!) 170/124 -- -- -- -- -- 100 %   07/29/20 2200 (!)  182/115 -- -- 70 -- -- --   07/29/20 2130 (!) 153/100 -- -- 67 -- -- --   07/29/20 2100 (!) 200/106 -- -- 92 -- -- --   07/29/20 2059 (!) 200/106 -- -- -- -- -- --   07/29/20 2029 (!) 209/125 98.8  F (37.1  C) Oral -- 80 20 98 %   07/29/20 2026 (!) 209/125 98.8  F (37.1  C) Oral -- 80 20 98 %          Physical Exam  General: Alert and cooperative with exam. Resting comfortably on gurney  Head:  Scalp is NC/AT  Eyes:  No scleral icterus, PERRL, extraocular movements intact without pain no proptosis.  ENT:  The external nose and ears are normal.     Very poor dentition multiple cracked teeth, and some swelling and tenderness to palpation over the left maxilla.  No fluctuance.  No gingival abscess.  The oropharynx is normal and without erythema or tonsillar swelling. Uvula midline, no submandibular swelling, sublingual swelling, trismus.  TM's normal BL, no mastoid swelling or tenderness  Neck:  Normal range of motion without rigidity.  CV:  Regular rate and rhythm    No pathologic murmur, rubs, or gallops.  Resp:  Breath sounds are clear bilaterally.  No crackles, wheezes, rhonchi, stridor.    Non-labored, no retractions or accessory muscle use  MS:  No lower extremity edema or asymmetric calf swelling. Normal ROM in all joints without effusions.    No midline cervical, thoracic, or lumbar tenderness  Skin:  Warm and dry, No rash or lesions noted. 2+ peripheral pulses in all extremities  Neuro:  Oriented. No gross motor deficits. GCS 15     Cranial nerves 2-12 intact.   Lymph: Mild anterior cervical lymphadenopathy.  Shotty.  Psych: Awake. Alert. Normal affect. Appropriate interactions.      Emergency Department Course     Imaging:  Radiology results were communicated with the patient who voiced understanding of the findings.    CT Facial Bones with Contrast  1.  Extensive dental and periodontal disease with a small odontogenic abscess along the gingivobuccal surface adjacent to the left lateral maxillary incisor. The  abscess measures approximately 5 x 4 x 6 mm in size. Mild overlying soft tissue cellulitis.  2.  Mildly enlarged cervical lymph nodes, likely reactive.    Reading per radiology     Laboratory:  Laboratory findings were communicated with the patient who voiced understanding of the findings.    CBC: WBC 11.7 (H), HGB 13.9,   BMP: Anion gap 1 (L), Glucose 101 (H) (Creatinine 0.96) o/w WNL     Interventions:   2119 Oxycodone 10 mg PO  2326 Dilaudid 0.5 mg IV  2334 Clindamycin 450 mg PO     Emergency Department Course:    2052 Nursing notes and vitals reviewed.    2110 I performed an exam of the patient as documented above.     2129 IV was inserted and blood was drawn for laboratory testing, results above.    2235 The patient was sent for CT while in the emergency department, results above.     2336 Findings and plan explained to the Patient. Patient discharged home with instructions regarding supportive care, medications, and reasons to return. The importance of close follow-up was reviewed. The patient was prescribed as below.    Impression & Plan     Medical Decision Making:  The patient presents with a dental pain and facial swelling.  History and records reviewed.  On examination, she has some tenderness and swelling over the left maxilla.  She is afebrile and nontoxic-appearing.  CT scan was obtained which demonstrates extensive dental disease with small odontogenic abscess in the gingival buccal surface of the left lateral maxillary incisor.  This is approximately 1/2 cm in size and does not require incision and drainage at this time.  No evidence of cavernous sinus thrombosis, orbital cellulitis, periorbital cellulitis.  No signs of meningitis, osteomyelitis, RPA, PTA, epiglottitis, Marcy's angina.  Blood work notable for slight leukocytosis otherwise unremarkable.  She is quite hypertensive here in the ED which I think is secondary to pain and anxiety.  Kidney function is normal.  No symptoms or  evidence of other hypertensive emergency such as ACS, dissection, stroke, CHF, PRES, hypertensive encephalopathy, pre-eclampsia.    I will place her on clindamycin and she was given her first dose here in the ED.  She will be discharged home with symptomatic medications for pain.  I stressed the importance of urgent follow-up with a dentist in the next 1 to 2 days and she was provided a list of dentists that do same-day walk-in appointments.  She will return to the ED immediately if she has fever, increased swelling, vision changes, pain with extraocular movements, neck stiffness, or severe headache.      Diagnosis:     ICD-10-CM    1. Abscess of buccal space of mouth  K12.2    2. Jaw pain  R68.84    3. Elevated blood pressure, situational  R03.0         Disposition:  Discharged to home.    Discharge Medications:  New Prescriptions    CLINDAMYCIN (CLEOCIN) 150 MG CAPSULE    Take 3 capsules (450 mg) by mouth 3 times daily for 8 days    IBUPROFEN (ADVIL/MOTRIN) 600 MG TABLET    Take 1 tablet (600 mg) by mouth every 8 hours as needed for moderate pain    OXYCODONE-ACETAMINOPHEN (PERCOCET) 5-325 MG TABLET    Take 1 tablet by mouth every 4 hours as needed for pain       Scribe Disclosure:  I, Valeria Elmore, am serving as a scribe at 9:04 PM on 7/29/2020 to document services personally performed by Wesley Salvador PA-C based on my observations and the provider's statements to me.      Wesley Salvador PA-C  07/29/20 [id number]

## 2020-07-30 NOTE — DISCHARGE INSTRUCTIONS
Discharge Instructions  Dental Pain    You have been seen today for a toothache. Your pain may be caused by an exposed nerve, an infection (pulpitis), a root abscess (pocket of pus), or other problems. You will need to see a dentist for a solution to your tooth problem. Emergency Department care is only to help control your problem until you can see a dentist; we cannot provide complete dental care.  Today, we did not find any sign that your toothache was caused by any dangerous or life-threatening condition, but sometimes symptoms develop over time and cannot be found during an emergency visit, so it is very important that you follow up with your dentist.      Generally, every Emergency Department visit should have a follow-up clinic visit with either a primary or a specialty clinic/provider. Please follow-up as instructed by your emergency provider today.    Return to the Emergency Department if:  You develop a new fever over 100.4 F.  You cannot open your mouth normally, cannot move your tongue well, or cannot swallow.  You have new or increased swelling of your face or neck.  You develop drainage of pus or foul smelling material from around your tooth.  What can I do to help myself?  Take any antibiotic the provider may have prescribed for you today.  Avoid very hot or very cold foods as both can cause pain.  Make an appointment to see a dentist as soon as possible. Dentists are generally not  on-staff  at hospitals so we cannot  refer  to you to dentist but we may be able to provide a list of dental clinics to help you.  If you were given a prescription for medicine here today, be sure to read all of the information (including the package insert) that comes with your prescription.  This will include important information about the medicine, its side effects, and any warnings that you need to know about.  The pharmacist who fills the prescription can provide more information and answer questions you may have  about the medicine.  If you have questions or concerns that the pharmacist cannot address, please call or return to the Emergency Department.   Remember that you can always come back to the Emergency Department if you are not able to see your regular provider in the amount of time listed above, if you get any new symptoms, or if there is anything that worries you.  Discharge Instructions  Hypertension - High Blood Pressure    During you visit to the Emergency Department, your blood pressure was higher than the recommended blood pressure.  This may be related to stress, pain, medication or other temporary conditions. In these cases, your blood pressure may return to normal on its own. If you have a history of high blood pressure, you may need to have your provider adjust your medications. Sometimes, your high measurement here may indicate that you have developed high blood pressure that will stay high unless it is treated. As a general rule, high blood pressure causes problems over years rather than days, weeks, or months. So, while it is important to treat blood pressure, it is rarely important to treat blood pressure immediately. Occasionally we will begin a medication in the Emergency Department; more often we will recommend close follow-up for medications with a primary doctor/clinic.    Generally, every Emergency Department visit should have a follow-up clinic visit with either a primary or a specialty clinic/provider. Please follow-up as instructed by your emergency provider today.    Return to the Emergency Department if you start to have:  A severe headache.  Chest pain.  Shortness of breath.  Weakness or numbness that affects one part of the body.  Confusion.  Vision changes.  Significant swelling of legs and/or eyes.  A reaction to any medication started in the Emergency Department.    What can I do to help myself?  Avoid alcohol.  Take any blood pressure medicine that you are prescribed.  Get a good  night s sleep.  Lower your salt intake.  Exercise.  Lose weight.  Manage stress.  See your doctor regularly    If blood pressure medication was started in the Emergency Department:  The medicine may not have an immediate effect. The body and brain determine what blood pressure you have. The medicine s job is to retrain the body s  thermostat  to a lower blood pressure.  You will need to follow up with your provider to see how this medicine is working for you.  If you were given a prescription for medicine here today, be sure to read all of the information (including the package insert) that comes with your prescription.  This will include important information about the medicine, its side effects, and any warnings that you need to know about.  The pharmacist who fills the prescription can provide more information and answer questions you may have about the medicine.  If you have questions or concerns that the pharmacist cannot address, please call or return to the Emergency Department.   Remember that you can always come back to the Emergency Department if you are not able to see your regular provider in the amount of time listed above, if you get any new symptoms, or if there is anything that worries you.

## 2022-09-10 ENCOUNTER — HOSPITAL ENCOUNTER (EMERGENCY)
Facility: CLINIC | Age: 47
Discharge: LEFT BEFORE DISCHARGE | End: 2022-09-11
Attending: EMERGENCY MEDICINE | Admitting: EMERGENCY MEDICINE
Payer: MEDICARE

## 2022-09-10 VITALS
SYSTOLIC BLOOD PRESSURE: 149 MMHG | TEMPERATURE: 98.1 F | RESPIRATION RATE: 18 BRPM | DIASTOLIC BLOOD PRESSURE: 100 MMHG | HEART RATE: 82 BPM | OXYGEN SATURATION: 100 %

## 2022-09-10 DIAGNOSIS — R60.0 BILATERAL LOWER EXTREMITY EDEMA: ICD-10-CM

## 2022-09-10 DIAGNOSIS — G89.29 CHRONIC LEFT-SIDED LOW BACK PAIN WITH LEFT-SIDED SCIATICA: ICD-10-CM

## 2022-09-10 DIAGNOSIS — R03.0 ELEVATED BLOOD PRESSURE READING WITHOUT DIAGNOSIS OF HYPERTENSION: ICD-10-CM

## 2022-09-10 DIAGNOSIS — M54.42 CHRONIC LEFT-SIDED LOW BACK PAIN WITH LEFT-SIDED SCIATICA: ICD-10-CM

## 2022-09-10 DIAGNOSIS — S00.03XA CONTUSION OF SCALP, INITIAL ENCOUNTER: ICD-10-CM

## 2022-09-10 PROCEDURE — 99284 EMERGENCY DEPT VISIT MOD MDM: CPT | Mod: 25

## 2022-09-10 ASSESSMENT — ACTIVITIES OF DAILY LIVING (ADL): ADLS_ACUITY_SCORE: 35

## 2022-09-10 NOTE — ED TRIAGE NOTES
Patient reports chronic back pain, patient states that her back has been giving out causing her to fall.  Patient also states she has had headaches as well. Patient would also like to be seen for lower extremity swelling for the last 4 months.      Triage Assessment     Row Name 09/10/22 1764       Triage Assessment (Adult)    Airway WDL WDL       Respiratory WDL    Respiratory WDL WDL       Skin Circulation/Temperature WDL    Skin Circulation/Temperature WDL WDL       Cardiac WDL    Cardiac WDL WDL       Peripheral/Neurovascular WDL    Peripheral Neurovascular WDL WDL       Cognitive/Neuro/Behavioral WDL    Cognitive/Neuro/Behavioral WDL WDL

## 2022-09-11 ENCOUNTER — APPOINTMENT (OUTPATIENT)
Dept: CT IMAGING | Facility: CLINIC | Age: 47
End: 2022-09-11
Attending: EMERGENCY MEDICINE
Payer: MEDICARE

## 2022-09-11 PROCEDURE — G1010 CDSM STANSON: HCPCS

## 2022-09-11 RX ORDER — ASPIRIN 325 MG
650 TABLET ORAL ONCE
Status: DISCONTINUED | OUTPATIENT
Start: 2022-09-11 | End: 2022-09-11 | Stop reason: HOSPADM

## 2022-09-11 RX ORDER — GABAPENTIN 300 MG/1
300 CAPSULE ORAL ONCE
Status: DISCONTINUED | OUTPATIENT
Start: 2022-09-11 | End: 2022-09-11 | Stop reason: HOSPADM

## 2022-09-11 RX ORDER — ACETAMINOPHEN 500 MG
1000 TABLET ORAL ONCE
Status: DISCONTINUED | OUTPATIENT
Start: 2022-09-11 | End: 2022-09-11 | Stop reason: HOSPADM

## 2022-09-11 ASSESSMENT — ENCOUNTER SYMPTOMS
SHORTNESS OF BREATH: 0
HEADACHES: 1
VOMITING: 0
FEVER: 0
NUMBNESS: 1
DYSURIA: 1
COUGH: 0
DIARRHEA: 0
ABDOMINAL PAIN: 0
AGITATION: 1
NAUSEA: 1
BACK PAIN: 1
CHILLS: 0

## 2022-09-11 ASSESSMENT — ACTIVITIES OF DAILY LIVING (ADL)
ADLS_ACUITY_SCORE: 35
ADLS_ACUITY_SCORE: 35

## 2022-09-11 NOTE — ED NOTES
Pt continues to swear at staff during AMA discharge process.  Pt provided with AVS.  Pt refuses to sign AMA paperwork.

## 2022-09-11 NOTE — ED NOTES
Pt went outside to make a phone call and was pulled out of triage queue by another staff. Pt continues to yell after apologizing for mistake. Pt wants to speak to who is in charge, will update charge RN. Pt placed back in appropriate line. Pt states she was not triage properly and that the RN told her she was not sick.

## 2022-09-11 NOTE — ED NOTES
"Writer offered patient Gabapentin ordered by MD. Patient refused to take this, stating \"I already told him I won't take that\". Writer asked patient again, if she still wanted to have CT scan completed. Patient agreed to CT scan. Patient continues to talk to self loudly in room, with profanities.   "

## 2022-09-11 NOTE — ED NOTES
Pt continues to be upset that she was removed from queue and that people are brought back ahead of her. Attempted to educate pt on acuity and criticalness of pt. Pt refuses and thinks that she is more critical. Pt demanding an apology from tech who took her out of queue. Told pt tech is aware of situation. Charge RN notified and talking to pt.
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
## 2022-09-11 NOTE — DISCHARGE INSTRUCTIONS
Diagnosis: Bilateral leg edema, head contusion, chronic low back pain with left-sided sciatica.     Plan: Follow up with your primary care physician for further testing for your leg swelling that you declined from the emergency department today. You can return at any time for any reason to complete your testing.     Follow-up with your spine specialist for further discussion regarding your ongoing back pain and sciatica.      Please read the remainder of your discharge instructions for more information.     Discharge Instructions  Back Pain  You were seen today for back pain. Back pain can have many causes, but most will get better without surgery or other specific treatment. Sometimes there is a herniated ( slipped ) disc. We do not usually do MRI scans to look for these right away, since most herniated discs will get better on their own with time.  Today, we did not find any evidence that your back pain was caused by a serious condition. However, sometimes symptoms develop over time and cannot be found during an emergency visit, so it is very important that you follow up with your primary provider.  Generally, every Emergency Department visit should have a follow-up clinic visit with either a primary or a specialty clinic/provider. Please follow-up as instructed by your emergency provider today.    Return to the Emergency Department if:  You develop a fever with your back pain.   You have weakness or change in sensation in one or both legs.  You lose control of your bowels or bladder, or cannot empty your bladder (cannot pee).  Your pain gets much worse.     Follow-up with your provider:  Unless your pain has completely gone away, please make an appointment with your provider within one week. Most of the routine care for back pain is available in a clinic and not the Emergency Department. You may need further management of your back pain, such as more pain medication, imaging such as an X-ray or MRI, or physical  therapy.    What can I do to help myself?  Remain Active -- People are often afraid that they will hurt their back further or delay recovery by remaining active, but this is one of the best things you can do for your back. In fact, staying in bed for a long time to rest is not recommended. Studies have shown that people with low back pain recover faster when they remain active. Movement helps to bring blood flow to the muscles and relieve muscle spasms as well as preventing loss of muscle strength.  Heat -- Using a heating pad can help with low back pain during the first few weeks. Do not sleep with a heating pad, as you can be burned.   Pain medications - You may take a pain medication such as Tylenol  (acetaminophen), Advil , Motrin  (ibuprofen) or Aleve  (naproxen).  If you were given a prescription for medicine here today, be sure to read all of the information (including the package insert) that comes with your prescription.  This will include important information about the medicine, its side effects, and any warnings that you need to know about.  The pharmacist who fills the prescription can provide more information and answer questions you may have about the medicine.  If you have questions or concerns that the pharmacist cannot address, please call or return to the Emergency Department.   Remember that you can always come back to the Emergency Department if you are not able to see your regular provider in the amount of time listed above, if you get any new symptoms, or if there is anything that worries you.    Discharge Instructions  Hypertension - High Blood Pressure    During you visit to the Emergency Department, your blood pressure was higher than the recommended blood pressure.  This may be related to stress, pain, medication or other temporary conditions. In these cases, your blood pressure may return to normal on its own. If you have a history of high blood pressure, you may need to have your  provider adjust your medications. Sometimes, your high measurement here may indicate that you have developed high blood pressure that will stay high unless it is treated. As a general rule, high blood pressure causes problems over years rather than days, weeks, or months. So, while it is important to treat blood pressure, it is rarely important to treat blood pressure immediately. Occasionally we will begin a medication in the Emergency Department; more often we will recommend close follow-up for medications with a primary doctor/clinic.    Generally, every Emergency Department visit should have a follow-up clinic visit with either a primary or a specialty clinic/provider. Please follow-up as instructed by your emergency provider today.    Return to the Emergency Department if you start to have:  A severe headache.  Chest pain.  Shortness of breath.  Weakness or numbness that affects one part of the body.  Confusion.  Vision changes.  Significant swelling of legs and/or eyes.  A reaction to any medication started in the Emergency Department.    What can I do to help myself?  Avoid alcohol.  Take any blood pressure medicine that you are prescribed.  Get a good night s sleep.  Lower your salt intake.  Exercise.  Lose weight.  Manage stress.  See your doctor regularly    If blood pressure medication was started in the Emergency Department:  The medicine may not have an immediate effect. The body and brain determine what blood pressure you have. The medicine s job is to retrain the body s  thermostat  to a lower blood pressure.  You will need to follow up with your provider to see how this medicine is working for you.  If you were given a prescription for medicine here today, be sure to read all of the information (including the package insert) that comes with your prescription.  This will include important information about the medicine, its side effects, and any warnings that you need to know about.  The pharmacist  who fills the prescription can provide more information and answer questions you may have about the medicine.  If you have questions or concerns that the pharmacist cannot address, please call or return to the Emergency Department.   Remember that you can always come back to the Emergency Department if you are not able to see your regular provider in the amount of time listed above, if you get any new symptoms, or if there is anything that worries you.
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
## 2022-09-12 NOTE — ED PROVIDER NOTES
History     Chief Complaint:  Back Pain      HPI   Karen Chance is a 47 year old female who presents with multiple complaints. She reports her biggest complaint tonight is bilateral lower extremity swelling which she reports has been ongoing for 3-4 months. She states her legs have been breaking open and draining pus and blood and she is concerned for infection. She believes her swelling is due to her lower back of which she reports she has chronic pain and sciatica. She reports it has been worse recently resulting in 2 recent falls, the most recent of which was about 4 days ago and resulted in head trauma and reported LOC. She reports nausea without vomiting. She states her leg swelling improves when she puts her legs up during the weekend and then gets worse during the week. She states she takes tylenol and ibuprofen for her pain without relief. Reports worsened back pain with urination. No incontinence. Denies fevers.    Please see other ED notes from nursing regarding pt's behavior on arrival before she was formally seen. Charge RN and House Supervisor in to see patient.     Review of Systems   Constitutional: Negative for chills and fever.   HENT: Negative for congestion.    Respiratory: Negative for cough and shortness of breath.    Cardiovascular: Negative for chest pain.   Gastrointestinal: Positive for nausea. Negative for abdominal pain, diarrhea and vomiting.   Genitourinary: Positive for dysuria.        No incontinence.    Musculoskeletal: Positive for back pain.        Left-sided low back pain.    Neurological: Positive for numbness and headaches.   Psychiatric/Behavioral: Positive for agitation.   All other systems reviewed and are negative.      Allergies:    Cefzil  Celebrex [Celecoxib]  Codeine Sulfate  Morphine Hcl  Naproxen  Sulfa Drugs  Toradol  Tramadol    Medications:    Tylenol  Ibuprofen     Past Medical History:   Past Surgical History:     Past Medical History:   Diagnosis  Date     Anxiety      Depressive disorder      Hypertension    Psychosis  ADHD Past Surgical History:   Procedure Laterality Date     ABDOMEN SURGERY       GYN SURGERY       HERNIA REPAIR       ORTHOPEDIC SURGERY              Social History:  PCP: Denies having one.   Presents to the ED by herself.   History of drug and alcohol abuse. Current use unknown 2/2 pt combativeness/evasiveness to questions.     Physical Exam       Physical Exam  General: Patient argumentative, frequently interrupting, not participating in full physical exam.   Head:  The scalp, face, and head appear normal. Pt reports pain/bruising to occiput. No hematoma palpated.   Eyes:  Conjunctivae are normal  ENT:    The nose is normal    Pinnae are normal  Neck:  Trachea midline  CV:  Normal rate. Heart not auscultated 2/2 pt agitation.   Resp:  No respiratory distress. Speaking in complete sentences. No audible wheezing. Not auscultated due to pt agitation.   Musc:  Normal muscular tone    Bilateral lower extremity edema with venous stasis changes bilaterally. No erythema/warmth. No purulent drainage. Bruising noted to left posterior calf. Small lesion noted to right anterior shin where pt reported purulent discharge. No current discharge.   Skin:  See above.   Neuro:  Speech is normal and fluent. Face is symmetric. Moving all extremities well.   Psych:  Awake. Alert. Agitated and tangential. Poor insight. Very inappropriate interactions.        Emergency Department Course     Imaging:  Head CT w/o contrast   Final Result   IMPRESSION:   1.  Normal head CT.      Imaging independently reviewed and agree with radiologist interpretation.       Laboratory:  Pt declined all labs including UA.         Emergency Department Course:           Reviewed:    I reviewed nursing notes, vitals, past history and care everywhere      Interventions:  Pt declined all offered medications.     Disposition:  The patient left AMA.    Impression & Plan        Medical  "Decision Making:  Patient presents with multiple complaints.  She is overall a very difficult historian, tangential, agitated, argumentative, frequently interrupting. Please see RN notes regarding her conduct while in ED. She has long history chronic low back pain with sciatica for which she last saw [facility] earlier this year. She was on gabapentin for some time, then states she ran out. She declined taking it here. No evidence of cauda equina syndrome or similar spinal emergency given history and allowed exam today. Doubt need for advanced imaging. She was recommended to follow-up with her spine team. Regarding her head injury 4 days ago, head CT was obtained and was negative for bleed/fracture. She was offered multimodal pain treatment with tylenol, aspirin, ibuprofen, or IV medications such as olanzapine and benadryl. She declined all of these. With negative head CT, doubt emergent etiology. Symptoms may be due to contusion vs concussion vs other. Encouraged her to continue supportive therapies at home. Regarding lower extremity swelling for several months, she reports concern for infection. No evidence of infection on exam. No purulent drainage. No cellulitis. Pt ambulatory. Normal knee/ankle ROM. Symptoms improve with rest/elevation of legs. Overall, this is mostly suggestive of dependent edema, however I ordered labs to rule out/in CHF, renal/liver pathology. Pt declined these as well stating \"I know what is wrong with my legs.\" Discussed with pt that declining labs would potentially put her health at risk if she was in heart, liver, or renal failure. She understands ramifications of forgoing labs. Ultimately, as she is declining further workup, she is discharged against medical advice. I encouraged primary care follow-up in the near future to continue workup. She reports she doesn't have a primary care physician any longer, so I therefore placed a referral to assist her with obtaining one. She may need blood " pressure control as well given history of elevated blood pressures and having been on BP medication in the past. While pt is very difficult, she doesn't appear to be hallucinating, nor a danger to herself or other at this time. She has capacity to decline further cares. Based on my limited interactions with her today I suspect underlying personality disorder complicating her medical treatment.  She was informed she may return to emergency department at any time for any reason to continue workup. She is in stable condition at time of AMA signout.       Covid-19  Karen Chance was evaluated during a global COVID-19 pandemic, which necessitated consideration that the patient might be at risk for infection with the SARS-CoV-2 virus that causes COVID-19.  Applicable protocols for evaluation were followed during the patient's care. COVID-19 was considered as part of the patient's evaluation.       Diagnosis:    ICD-10-CM    1. Contusion of scalp, initial encounter  S00.03XA Primary Care Referral   2. Chronic left-sided low back pain with left-sided sciatica  M54.42 Primary Care Referral    G89.29    3. Bilateral lower extremity edema  R60.0 Primary Care Referral   4. Elevated blood pressure reading without diagnosis of hypertension  R03.0                 Marcos Layton MD  09/11/22 2156

## 2023-06-19 NOTE — ED NOTES
"Patient calling out from room, yelling profanities after being assessed by MD. Writer attempted to speak with patient to gain understanding of situation, and patient raised voice saying \"I don't trust him, I won't let him fucking touch me\". Writer asked patient to please lower voice as there are other patients around. Patient continued to speak to writer with raised voice. At this time, charge RN and security notified.     " Acitretin Counseling:  I discussed with the patient the risks of acitretin including but not limited to hair loss, dry lips/skin/eyes, liver damage, hyperlipidemia, depression/suicidal ideation, photosensitivity.  Serious rare side effects can include but are not limited to pancreatitis, pseudotumor cerebri, bony changes, clot formation/stroke/heart attack.  Patient understands that alcohol is contraindicated since it can result in liver toxicity and significantly prolong the elimination of the drug by many years.

## 2024-05-24 ENCOUNTER — APPOINTMENT (OUTPATIENT)
Dept: GENERAL RADIOLOGY | Facility: CLINIC | Age: 49
End: 2024-05-24
Attending: EMERGENCY MEDICINE
Payer: MEDICARE

## 2024-05-24 ENCOUNTER — APPOINTMENT (OUTPATIENT)
Dept: ULTRASOUND IMAGING | Facility: CLINIC | Age: 49
End: 2024-05-24
Attending: EMERGENCY MEDICINE
Payer: MEDICARE

## 2024-05-24 ENCOUNTER — HOSPITAL ENCOUNTER (EMERGENCY)
Facility: CLINIC | Age: 49
Discharge: HOME | End: 2024-05-24
Attending: EMERGENCY MEDICINE | Admitting: EMERGENCY MEDICINE
Payer: MEDICARE

## 2024-05-24 VITALS
OXYGEN SATURATION: 100 % | WEIGHT: 203.71 LBS | BODY MASS INDEX: 34.78 KG/M2 | SYSTOLIC BLOOD PRESSURE: 180 MMHG | HEART RATE: 101 BPM | TEMPERATURE: 98 F | HEIGHT: 64 IN | RESPIRATION RATE: 20 BRPM | DIASTOLIC BLOOD PRESSURE: 94 MMHG

## 2024-05-24 DIAGNOSIS — L03.116 CELLULITIS OF LEFT LOWER EXTREMITY: ICD-10-CM

## 2024-05-24 PROBLEM — G25.9 EXTRAPYRAMIDAL REACTION: Status: ACTIVE | Noted: 2024-05-24

## 2024-05-24 PROBLEM — F29 PSYCHOSIS (H): Status: ACTIVE | Noted: 2024-05-24

## 2024-05-24 PROBLEM — F90.9 ADHD (ATTENTION DEFICIT HYPERACTIVITY DISORDER): Status: ACTIVE | Noted: 2024-05-24

## 2024-05-24 PROBLEM — F41.9 ANXIETY: Status: ACTIVE | Noted: 2024-05-24

## 2024-05-24 LAB
ANION GAP SERPL CALCULATED.3IONS-SCNC: 12 MMOL/L (ref 7–15)
BASOPHILS # BLD AUTO: 0 10E3/UL (ref 0–0.2)
BASOPHILS NFR BLD AUTO: 1 %
BUN SERPL-MCNC: 11.8 MG/DL (ref 6–20)
CALCIUM SERPL-MCNC: 9.2 MG/DL (ref 8.6–10)
CHLORIDE SERPL-SCNC: 104 MMOL/L (ref 98–107)
CREAT SERPL-MCNC: 0.96 MG/DL (ref 0.51–0.95)
D DIMER PPP FEU-MCNC: 1.52 UG/ML FEU (ref 0–0.5)
DEPRECATED HCO3 PLAS-SCNC: 25 MMOL/L (ref 22–29)
EGFRCR SERPLBLD CKD-EPI 2021: 72 ML/MIN/1.73M2
EOSINOPHIL # BLD AUTO: 0.2 10E3/UL (ref 0–0.7)
EOSINOPHIL NFR BLD AUTO: 3 %
ERYTHROCYTE [DISTWIDTH] IN BLOOD BY AUTOMATED COUNT: 13.1 % (ref 10–15)
FLUAV RNA SPEC QL NAA+PROBE: NEGATIVE
FLUBV RNA RESP QL NAA+PROBE: NEGATIVE
GLUCOSE SERPL-MCNC: 99 MG/DL (ref 70–99)
GROUP A STREP BY PCR: NOT DETECTED
HCT VFR BLD AUTO: 35 % (ref 35–47)
HGB BLD-MCNC: 11.5 G/DL (ref 11.7–15.7)
IMM GRANULOCYTES # BLD: 0 10E3/UL
IMM GRANULOCYTES NFR BLD: 0 %
LYMPHOCYTES # BLD AUTO: 2.2 10E3/UL (ref 0.8–5.3)
LYMPHOCYTES NFR BLD AUTO: 28 %
MCH RBC QN AUTO: 28.9 PG (ref 26.5–33)
MCHC RBC AUTO-ENTMCNC: 32.9 G/DL (ref 31.5–36.5)
MCV RBC AUTO: 88 FL (ref 78–100)
MONOCYTES # BLD AUTO: 0.6 10E3/UL (ref 0–1.3)
MONOCYTES NFR BLD AUTO: 8 %
NEUTROPHILS # BLD AUTO: 4.9 10E3/UL (ref 1.6–8.3)
NEUTROPHILS NFR BLD AUTO: 60 %
NRBC # BLD AUTO: 0 10E3/UL
NRBC BLD AUTO-RTO: 0 /100
PLATELET # BLD AUTO: 284 10E3/UL (ref 150–450)
POTASSIUM SERPL-SCNC: 3.6 MMOL/L (ref 3.4–5.3)
RBC # BLD AUTO: 3.98 10E6/UL (ref 3.8–5.2)
RSV RNA SPEC NAA+PROBE: NEGATIVE
SARS-COV-2 RNA RESP QL NAA+PROBE: NEGATIVE
SODIUM SERPL-SCNC: 141 MMOL/L (ref 135–145)
WBC # BLD AUTO: 8 10E3/UL (ref 4–11)

## 2024-05-24 PROCEDURE — 87637 SARSCOV2&INF A&B&RSV AMP PRB: CPT | Mod: GZ | Performed by: EMERGENCY MEDICINE

## 2024-05-24 PROCEDURE — 85379 FIBRIN DEGRADATION QUANT: CPT | Performed by: EMERGENCY MEDICINE

## 2024-05-24 PROCEDURE — 80048 BASIC METABOLIC PNL TOTAL CA: CPT | Performed by: EMERGENCY MEDICINE

## 2024-05-24 PROCEDURE — 87651 STREP A DNA AMP PROBE: CPT | Performed by: EMERGENCY MEDICINE

## 2024-05-24 PROCEDURE — 93971 EXTREMITY STUDY: CPT | Mod: LT

## 2024-05-24 PROCEDURE — 250N000013 HC RX MED GY IP 250 OP 250 PS 637: Performed by: EMERGENCY MEDICINE

## 2024-05-24 PROCEDURE — 250N000011 HC RX IP 250 OP 636: Mod: JZ | Performed by: EMERGENCY MEDICINE

## 2024-05-24 PROCEDURE — 36415 COLL VENOUS BLD VENIPUNCTURE: CPT | Performed by: EMERGENCY MEDICINE

## 2024-05-24 PROCEDURE — 99285 EMERGENCY DEPT VISIT HI MDM: CPT | Mod: 25

## 2024-05-24 PROCEDURE — 85025 COMPLETE CBC W/AUTO DIFF WBC: CPT | Performed by: EMERGENCY MEDICINE

## 2024-05-24 PROCEDURE — 71046 X-RAY EXAM CHEST 2 VIEWS: CPT

## 2024-05-24 PROCEDURE — 96365 THER/PROPH/DIAG IV INF INIT: CPT

## 2024-05-24 RX ORDER — HYDROCODONE BITARTRATE AND ACETAMINOPHEN 5; 325 MG/1; MG/1
1 TABLET ORAL ONCE
Status: COMPLETED | OUTPATIENT
Start: 2024-05-24 | End: 2024-05-24

## 2024-05-24 RX ORDER — CLINDAMYCIN HCL 150 MG
450 CAPSULE ORAL 4 TIMES DAILY
Qty: 120 CAPSULE | Refills: 0 | Status: SHIPPED | OUTPATIENT
Start: 2024-05-24 | End: 2024-06-03

## 2024-05-24 RX ORDER — CLINDAMYCIN PHOSPHATE 600 MG/50ML
600 INJECTION, SOLUTION INTRAVENOUS ONCE
Status: COMPLETED | OUTPATIENT
Start: 2024-05-24 | End: 2024-05-24

## 2024-05-24 RX ORDER — CYCLOBENZAPRINE HCL 10 MG
10 TABLET ORAL ONCE
Status: COMPLETED | OUTPATIENT
Start: 2024-05-24 | End: 2024-05-24

## 2024-05-24 RX ORDER — CYCLOBENZAPRINE HCL 10 MG
5-10 TABLET ORAL 3 TIMES DAILY PRN
Qty: 20 TABLET | Refills: 0 | Status: SHIPPED | OUTPATIENT
Start: 2024-05-24

## 2024-05-24 RX ADMIN — CYCLOBENZAPRINE 10 MG: 10 TABLET, FILM COATED ORAL at 03:33

## 2024-05-24 RX ADMIN — CLINDAMYCIN PHOSPHATE 600 MG: 600 INJECTION, SOLUTION INTRAVENOUS at 02:05

## 2024-05-24 RX ADMIN — HYDROCODONE BITARTRATE AND ACETAMINOPHEN 1 TABLET: 5; 325 TABLET ORAL at 03:20

## 2024-05-24 ASSESSMENT — ACTIVITIES OF DAILY LIVING (ADL)
ADLS_ACUITY_SCORE: 33
ADLS_ACUITY_SCORE: 35
ADLS_ACUITY_SCORE: 35

## 2024-05-24 ASSESSMENT — COLUMBIA-SUICIDE SEVERITY RATING SCALE - C-SSRS
6. HAVE YOU EVER DONE ANYTHING, STARTED TO DO ANYTHING, OR PREPARED TO DO ANYTHING TO END YOUR LIFE?: NO
2. HAVE YOU ACTUALLY HAD ANY THOUGHTS OF KILLING YOURSELF IN THE PAST MONTH?: NO
1. IN THE PAST MONTH, HAVE YOU WISHED YOU WERE DEAD OR WISHED YOU COULD GO TO SLEEP AND NOT WAKE UP?: NO

## 2024-05-24 NOTE — ED PROVIDER NOTES
"    History     Chief Complaint:  Leg Swelling       HPI   Karen Chance is a 48 year old female with complex past medical history including chemical dependency, polysubstance abuse, anxiety, ADHD and psychiatric disorder who presents to the emergency department with swelling, redness and warmth to her left lower extremity.  Patient reports that redness and swelling started 3 days ago.  She has had cellulitis before in the past and this is presenting similarly.  She denies any trauma to the area but notes it was quite itchy and then began having weeping drainage.  She has been washing it and putting triple antibiotic ointment on without significant relief.  She also reports congestion, cough, neck discomfort and chest pain with cough.  She wonders if she may have some exposure to black mold in her apartment.      Medications:    clindamycin (CLEOCIN) 150 MG capsule  cyclobenzaprine (FLEXERIL) 10 MG tablet  atenolol (TENORMIN) 50 MG tablet  ibuprofen (ADVIL/MOTRIN) 600 MG tablet      Past Medical History:    Past Medical History:   Diagnosis Date    Anxiety     Depressive disorder     Hypertension        Past Surgical History:    Past Surgical History:   Procedure Laterality Date    ABDOMEN SURGERY      GYN SURGERY      HERNIA REPAIR      ORTHOPEDIC SURGERY            Physical Exam   Patient Vitals for the past 24 hrs:   BP Temp Temp src Pulse Resp SpO2 Height Weight   05/24/24 0051 (!) 180/94 98  F (36.7  C) Temporal 101 20 100 % 1.626 m (5' 4\") 92.4 kg (203 lb 11.3 oz)        Physical Exam  General: Patient is awake, alert  Head: The scalp, face, and head appear normal  Eyes: The pupils are equal, round, and reactive to light. Conjunctivae and sclerae are normal  ENT: External acoustic canals are normal. The oropharynx is normal without erythema. Uvula is in the midline  Neck: Normal range of motion.   CV: Regular rate and rhythm.   Resp: Lungs are clear without wheezes or rales. No respiratory distress. "   GI: Abdomen is soft, no rigidity, guarding, or rebound. No distension. No tenderness to palpation in any quadrant.     MS: Normal tone. Joints grossly normal without effusions. No asymmetric leg swelling, calf or thigh tenderness.    Skin: Significant erythema, warmth and induration circumferentially of the left lower extremity beginning just distal to the knee and extending to just proximal to the ankle.  There are several areas of excoriation and healing wounds.          Neuro: Speech is normal and fluent. Face is symmetric. Moving all extremities.   Psych:  Normal affect.  Appropriate interactions.       Emergency Department Course     Imaging:  XR Chest 2 Views   Final Result   IMPRESSION: Normal heart size. Lungs are clear. No pneumothorax or pleural effusion.      US Lower Extremity Venous Duplex Left   Final Result   IMPRESSION:   1.  No deep venous thrombosis in the left lower extremity.      2.  Subcutaneous edema left lower extremity.          Laboratory:  Labs Ordered and Resulted from Time of ED Arrival to Time of ED Departure   D DIMER QUANTITATIVE - Abnormal       Result Value    D-Dimer Quantitative 1.52 (*)    BASIC METABOLIC PANEL - Abnormal    Sodium 141      Potassium 3.6      Chloride 104      Carbon Dioxide (CO2) 25      Anion Gap 12      Urea Nitrogen 11.8      Creatinine 0.96 (*)     GFR Estimate 72      Calcium 9.2      Glucose 99     CBC WITH PLATELETS AND DIFFERENTIAL - Abnormal    WBC Count 8.0      RBC Count 3.98      Hemoglobin 11.5 (*)     Hematocrit 35.0      MCV 88      MCH 28.9      MCHC 32.9      RDW 13.1      Platelet Count 284      % Neutrophils 60      % Lymphocytes 28      % Monocytes 8      % Eosinophils 3      % Basophils 1      % Immature Granulocytes 0      NRBCs per 100 WBC 0      Absolute Neutrophils 4.9      Absolute Lymphocytes 2.2      Absolute Monocytes 0.6      Absolute Eosinophils 0.2      Absolute Basophils 0.0      Absolute Immature Granulocytes 0.0      Absolute  NRBCs 0.0     INFLUENZA A/B, RSV, & SARS-COV2 PCR - Normal    Influenza A PCR Negative      Influenza B PCR Negative      RSV PCR Negative      SARS CoV2 PCR Negative     GROUP A STREPTOCOCCUS PCR THROAT SWAB - Normal    Group A strep by PCR Not Detected          Emergency Department Course & Assessments:    Interventions:  Medications   clindamycin (CLEOCIN) 600 mg in 50 mL D5W intermittent infusion (0 mg Intravenous Stopped 5/24/24 0320)   HYDROcodone-acetaminophen (NORCO) 5-325 MG per tablet 1 tablet (1 tablet Oral $Given 5/24/24 0320)   cyclobenzaprine (FLEXERIL) tablet 10 mg (10 mg Oral $Given 5/24/24 0333)         Disposition:  The patient was discharged.    Impression & Plan    Medical Decision Making:  Patient is a 48-year-old female who presents emergency department with a swollen, red and tender left lower extremity.  History and physical exam is consistent with cellulitis.  Ultrasound shows no signs of concomitant DVT.  No evidence of sepsis at this juncture.  Patient was given a first dose of IV antibiotics here in the emergency department and will be sent home on clindamycin.  Previously was treated with doxycycline without significant relief.  No significant evidence of pneumonia or significant pulmonary pathology in regards to her chest pain and shortness of breath.  COVID, influenza and RSV testing negative.  Strep testing negative.  Likely upper viral URI.  Will follow-up with her primary care team and return the emergency room with any new or worsening symptoms.    Diagnosis:    ICD-10-CM    1. Cellulitis of left lower extremity  L03.116            Discharge Medications:  Discharge Medication List as of 5/24/2024  3:24 AM        START taking these medications    Details   clindamycin (CLEOCIN) 150 MG capsule Take 3 capsules (450 mg) by mouth 4 times daily for 10 days, Disp-120 capsule, R-0, Local Print            MD Mark Blair, Deonte Pizarro MD  05/24/24  3204

## 2024-05-24 NOTE — ED TRIAGE NOTES
Pt has redness to her left lower leg that started 3 days ago. Pt has cellulitis to the left lower leg in the past which cleared up while she was in assisted. Pt report black mold in her apartment. Pt c/o congestion, nasal drainage.